# Patient Record
Sex: FEMALE | Race: BLACK OR AFRICAN AMERICAN | Employment: FULL TIME | URBAN - METROPOLITAN AREA
[De-identification: names, ages, dates, MRNs, and addresses within clinical notes are randomized per-mention and may not be internally consistent; named-entity substitution may affect disease eponyms.]

---

## 2019-04-25 ENCOUNTER — OFFICE VISIT (OUTPATIENT)
Dept: ONCOLOGY | Age: 44
End: 2019-04-25

## 2019-04-25 ENCOUNTER — HOSPITAL ENCOUNTER (OUTPATIENT)
Dept: INFUSION THERAPY | Age: 44
Discharge: HOME | End: 2019-04-25
Payer: MEDICAID

## 2019-04-25 VITALS
SYSTOLIC BLOOD PRESSURE: 132 MMHG | DIASTOLIC BLOOD PRESSURE: 79 MMHG | HEART RATE: 65 BPM | TEMPERATURE: 98 F | OXYGEN SATURATION: 100 %

## 2019-04-25 VITALS
RESPIRATION RATE: 18 BRPM | HEART RATE: 65 BPM | DIASTOLIC BLOOD PRESSURE: 65 MMHG | SYSTOLIC BLOOD PRESSURE: 109 MMHG | WEIGHT: 259 LBS | OXYGEN SATURATION: 100 % | TEMPERATURE: 98 F

## 2019-04-25 DIAGNOSIS — D64.9 ANEMIA, UNSPECIFIED TYPE: Primary | ICD-10-CM

## 2019-04-25 DIAGNOSIS — D64.9 ANEMIA, UNSPECIFIED TYPE: ICD-10-CM

## 2019-04-25 LAB
ALBUMIN SERPL-MCNC: 3.1 G/DL (ref 3.4–5)
ALBUMIN/GLOB SERPL: 0.6 {RATIO} (ref 0.8–1.7)
ALP SERPL-CCNC: 70 U/L (ref 45–117)
ALT SERPL-CCNC: 19 U/L (ref 13–56)
ANION GAP SERPL CALC-SCNC: 6 MMOL/L (ref 3–18)
AST SERPL-CCNC: 14 U/L (ref 15–37)
BASOPHILS # BLD: 0 K/UL (ref 0–0.1)
BASOPHILS NFR BLD: 1 % (ref 0–2)
BILIRUB SERPL-MCNC: 0.3 MG/DL (ref 0.2–1)
BUN SERPL-MCNC: 17 MG/DL (ref 7–18)
BUN/CREAT SERPL: 21 (ref 12–20)
CALCIUM SERPL-MCNC: 8.6 MG/DL (ref 8.5–10.1)
CHLORIDE SERPL-SCNC: 108 MMOL/L (ref 100–108)
CO2 SERPL-SCNC: 25 MMOL/L (ref 21–32)
CREAT SERPL-MCNC: 0.81 MG/DL (ref 0.6–1.3)
DIFFERENTIAL METHOD BLD: ABNORMAL
EOSINOPHIL # BLD: 0.2 K/UL (ref 0–0.4)
EOSINOPHIL NFR BLD: 3 % (ref 0–5)
ERYTHROCYTE [DISTWIDTH] IN BLOOD BY AUTOMATED COUNT: 18.1 % (ref 11.6–14.5)
FERRITIN SERPL-MCNC: 1 NG/ML (ref 8–388)
FOLATE SERPL-MCNC: 15 NG/ML (ref 3.1–17.5)
GLOBULIN SER CALC-MCNC: 5.6 G/DL (ref 2–4)
GLUCOSE SERPL-MCNC: 83 MG/DL (ref 74–99)
HCT VFR BLD AUTO: 23.7 % (ref 35–45)
HGB BLD-MCNC: 6.2 G/DL (ref 12–16)
IRON SATN MFR SERPL: 3 %
IRON SERPL-MCNC: 15 UG/DL (ref 50–175)
LDH SERPL L TO P-CCNC: 223 U/L (ref 81–234)
LYMPHOCYTES # BLD: 2.1 K/UL (ref 0.9–3.6)
LYMPHOCYTES NFR BLD: 35 % (ref 21–52)
MCH RBC QN AUTO: 16.8 PG (ref 24–34)
MCHC RBC AUTO-ENTMCNC: 26.2 G/DL (ref 31–37)
MCV RBC AUTO: 64.1 FL (ref 74–97)
MONOCYTES # BLD: 0.4 K/UL (ref 0.05–1.2)
MONOCYTES NFR BLD: 7 % (ref 3–10)
NEUTS SEG # BLD: 3.4 K/UL (ref 1.8–8)
NEUTS SEG NFR BLD: 54 % (ref 40–73)
PLATELET # BLD AUTO: 374 K/UL (ref 135–420)
PMV BLD AUTO: 9.7 FL (ref 9.2–11.8)
POTASSIUM SERPL-SCNC: 3.6 MMOL/L (ref 3.5–5.5)
PROT SERPL-MCNC: 8.7 G/DL (ref 6.4–8.2)
RBC # BLD AUTO: 3.7 M/UL (ref 4.2–5.3)
RETICS/RBC NFR AUTO: 1.1 % (ref 0.5–2.3)
SODIUM SERPL-SCNC: 139 MMOL/L (ref 136–145)
TIBC SERPL-MCNC: 461 UG/DL (ref 250–450)
TSH SERPL DL<=0.05 MIU/L-ACNC: 1.1 UIU/ML (ref 0.36–3.74)
VIT B12 SERPL-MCNC: 306 PG/ML (ref 211–911)
WBC # BLD AUTO: 6.1 K/UL (ref 4.6–13.2)

## 2019-04-25 PROCEDURE — 83615 LACTATE (LD) (LDH) ENZYME: CPT

## 2019-04-25 PROCEDURE — 82607 VITAMIN B-12: CPT

## 2019-04-25 PROCEDURE — 36415 COLL VENOUS BLD VENIPUNCTURE: CPT

## 2019-04-25 PROCEDURE — 85025 COMPLETE CBC W/AUTO DIFF WBC: CPT

## 2019-04-25 PROCEDURE — 83010 ASSAY OF HAPTOGLOBIN QUANT: CPT

## 2019-04-25 PROCEDURE — 82728 ASSAY OF FERRITIN: CPT

## 2019-04-25 PROCEDURE — 83021 HEMOGLOBIN CHROMOTOGRAPHY: CPT

## 2019-04-25 PROCEDURE — 83540 ASSAY OF IRON: CPT

## 2019-04-25 PROCEDURE — 84443 ASSAY THYROID STIM HORMONE: CPT

## 2019-04-25 PROCEDURE — 86923 COMPATIBILITY TEST ELECTRIC: CPT

## 2019-04-25 PROCEDURE — 86900 BLOOD TYPING SEROLOGIC ABO: CPT

## 2019-04-25 PROCEDURE — 80053 COMPREHEN METABOLIC PANEL: CPT

## 2019-04-25 PROCEDURE — 85045 AUTOMATED RETICULOCYTE COUNT: CPT

## 2019-04-25 RX ORDER — LISINOPRIL AND HYDROCHLOROTHIAZIDE 10; 12.5 MG/1; MG/1
TABLET ORAL
Refills: 4 | COMMUNITY
Start: 2019-03-28

## 2019-04-25 NOTE — PROGRESS NOTES
Álvaro Sawant is a 37 y.o. female presenting today for a new patient appointment. Patient is ambulatory with no assistive devices and reports fatigue. Chief Complaint Patient presents with  Anemia Visit Vitals /65 (BP 1 Location: Right arm, BP Patient Position: Sitting) Pulse 65 Temp 98 °F (36.7 °C) (Oral) Resp 18 Wt 259 lb (117.5 kg) LMP 04/10/2019 (Exact Date) SpO2 100% Current Outpatient Medications Medication Sig  
 lisinopril-hydroCHLOROthiazide (PRINZIDE, ZESTORETIC) 10-12.5 mg per tablet No current facility-administered medications for this visit. Medications no longer taking/discontinued: none No flowsheet data found. 3 most recent PHQ Screens 4/25/2019 Little interest or pleasure in doing things Not at all Feeling down, depressed, irritable, or hopeless Not at all Total Score PHQ 2 0 No flowsheet data found. Learning Assessment 4/25/2019 PRIMARY LEARNER Patient HIGHEST LEVEL OF EDUCATION - PRIMARY LEARNER  GRADUATED HIGH SCHOOL OR GED PRIMARY LANGUAGE ENGLISH  
LEARNER PREFERENCE PRIMARY LISTENING  
  READING  
ANSWERED BY patient RELATIONSHIP SELF

## 2019-04-25 NOTE — PROGRESS NOTES
[facility] Oncology [address] [facility], [address] [facility] Office Phone: [phone] Fax: [phone] NEW HEME/ONC CONSULT Reason for visit:  Anemia HPI:   Roni Morfin is a 37 y.o.  female history of uterine fibroids status post myomectomy in ,  x2, HTN, who I was asked to see in consultation at the request of Dr. Katherine Healy for evaluation for anemia. Xavier Gonsalez HEMATOLOGY HISTORY: Get records Past medical history:Menses with blood clots and heavy first 2 days but regular with cramping Past surgical history:C-sectionx2, myomectomy Social history:No tobacco, alcohol or IVDU Family history: not relevant. No FMH of blleding disorder. Current Outpatient Medications Medication Sig Dispense Refill  lisinopril-hydroCHLOROthiazide (PRINZIDE, ZESTORETIC) 10-12.5 mg per tablet   4 Not on File Review of Systems All 12 point review of system negative except patient complaining of feeling spacey with difficulty concentrating as well as feeling fatigued. She has pagophagia:chews ice a lot. Objective: 
Physical Exam: 
Visit Vitals /65 (BP 1 Location: Right arm, BP Patient Position: Sitting) Pulse 65 Temp 98 °F (36.7 °C) (Oral) Resp 18 Wt 117.5 kg (259 lb) LMP 04/10/2019 (Exact Date) SpO2 100% General:  Alert, cooperative, no distress, appears stated age. Head:  Normocephalic, without obvious abnormality, atraumatic. Eyes:  Conjunctivae/corneas clear. PERRL, EOMs intact. Throat: Lips, mucosa, and tongue normal.   
Neck: Supple, symmetrical, trachea midline, no adenopathy, thyroid: no enlargement/tenderness/nodules Back:   Symmetric, no curvature. ROM normal. No CVA tenderness. Lungs:   Clear to auscultation bilaterally. Chest wall:  No tenderness or deformity. Heart:  Regular rate and rhythm, S1, S2 normal, no murmur, click, rub or gallop. Abdomen:   Soft, non-tender.  Bowel sounds normal. No masses,  No organomegaly. Extremities: Extremities normal, atraumatic, no cyanosis or edema. Skin: Skin color, texture, turgor normal. No rashes or lesions. Lymph nodes: Cervical, supraclavicular, and axillary nodes normal.  
Neurologic: CNII-XII intact. Diagnostic Imaging No results found for this or any previous visit. Lab Results No results found for: WBC, HGB, HGBP, HCT, PHCT, RBCH, PLT, MCV, HGBEXT, HCTEXT, PLTEXT No results found for: NA, K, CL, CO2, AGAP, GLU, BUN, CREA, BUCR, GFRAA, GFRNA, CA, TBIL, GPT, SGOT, AP, TP, ALB, GLOB, AGRAT, ALT Assessment/Plan: 
37 y.o. female 1. Anemia, unspecified type Likely has ARMIDA based on history and symptoms of pagopahgia and fatigue. Was told by PCP also that based on her labs, IV iron would be better for her. No BRBPR or melena. On 3/28/19, WBC=7.3, H/h=6.5/24. 3. Platelets=398, LNN=86 Check CBC with differential, CMP, iron profile, ferritin, B12 and folate, ldh and haptoglobin. Give  1 unit of pRBCs if Hb=7.0 Refer to GI Give IV iron if needed. Yesterday came back showed WBC 6.1, H&H 6.3/25, MCV 66, platelet 760. Patient to get 2 units of packed red blood cell tomorrow. Ferritin= 1, TSAT=3% Give IV iron Injectafer x2 after blood transfusion. Return in 2 weeks

## 2019-04-25 NOTE — PROGRESS NOTES
[facility] Progress Note Date: 2019 Name: Lidia Hayden MRN: [id number] : 1975 Peripheral Lab Draw Ms. Hopper to [facility], ambulatory [id number] accompanied by self. Pt was assessed and education was provided. Ms. Alyssa Aguilar vitals were reviewed and patient was observed for 5 minutes prior to treatment. Visit Vitals /79 (BP 1 Location: Right arm, BP Patient Position: Sitting) Pulse 65 Temp 98 °F (36.7 °C) SpO2 100% Recent Results (from the past 12 hour(s)) RETICULOCYTE COUNT Collection Time: 19 10:05 AM  
Result Value Ref Range Reticulocyte count 1.1 0.5 - 2.3 % CBC WITH AUTOMATED DIFF Collection Time: 19 10:05 AM  
Result Value Ref Range WBC 6.1 4.6 - 13.2 K/uL  
 RBC 3.70 (L) 4.20 - 5.30 M/uL HGB 6.2 (L) 12.0 - 16.0 g/dL HCT 23.7 (L) 35.0 - 45.0 % MCV 64.1 (L) 74.0 - 97.0 FL  
 MCH 16.8 (L) 24.0 - 34.0 PG  
 MCHC 26.2 (L) 31.0 - 37.0 g/dL  
 RDW 18.1 (H) 11.6 - 14.5 % PLATELET 418 062 - 745 K/uL MPV 9.7 9.2 - 11.8 FL  
 NEUTROPHILS 54 40 - 73 % LYMPHOCYTES 35 21 - 52 % MONOCYTES 7 3 - 10 % EOSINOPHILS 3 0 - 5 % BASOPHILS 1 0 - 2 %  
 ABS. NEUTROPHILS 3.4 1.8 - 8.0 K/UL  
 ABS. LYMPHOCYTES 2.1 0.9 - 3.6 K/UL  
 ABS. MONOCYTES 0.4 0.05 - 1.2 K/UL  
 ABS. EOSINOPHILS 0.2 0.0 - 0.4 K/UL  
 ABS. BASOPHILS 0.0 0.0 - 0.1 K/UL  
 DF AUTOMATED Blood obtained peripherally from left arm x 1 attempt with butterfly needle and sent to lab for cbc w/ diff, hemoglobin fractionation, haptoglobin, LD,reticulocyte count, TSH 3rd generation,Vitamin B12&folate, CMP,iron profile,ferritin,and type + crossmatch per written orders. No bleeding or hematoma noted at site. Guaze and coban applied. Ms. Avtar Johnson tolerated the phlebotomy, and had no complaints. Patient armband removed and shredded. Ms. Avtar Johnson was discharged from [place] [address] in stable condition at 1015. She is to return to physician for follow up. Nia Leon, Phlebotomist  
April 25, 2019 
11:03 AM

## 2019-04-26 ENCOUNTER — HOSPITAL ENCOUNTER (OUTPATIENT)
Dept: INFUSION THERAPY | Age: 44
Discharge: HOME | End: 2019-04-26
Payer: MEDICAID

## 2019-04-26 VITALS
OXYGEN SATURATION: 100 % | TEMPERATURE: 96.6 F | HEART RATE: 67 BPM | RESPIRATION RATE: 15 BRPM | SYSTOLIC BLOOD PRESSURE: 126 MMHG | DIASTOLIC BLOOD PRESSURE: 82 MMHG

## 2019-04-26 LAB — HAPTOGLOB SERPL-MCNC: 222 MG/DL (ref 34–200)

## 2019-04-26 PROCEDURE — 77030013169 SET IV BLD ICUM -A

## 2019-04-26 PROCEDURE — 74011250637 HC RX REV CODE- 250/637: Performed by: INTERNAL MEDICINE

## 2019-04-26 PROCEDURE — P9016 RBC LEUKOCYTES REDUCED: HCPCS

## 2019-04-26 PROCEDURE — 36430 TRANSFUSION BLD/BLD COMPNT: CPT

## 2019-04-26 RX ORDER — SODIUM CHLORIDE 9 MG/ML
250 INJECTION, SOLUTION INTRAVENOUS AS NEEDED
Status: DISCONTINUED | OUTPATIENT
Start: 2019-04-26 | End: 2019-04-29 | Stop reason: HOSPADM

## 2019-04-26 RX ORDER — ACETAMINOPHEN 325 MG/1
650 TABLET ORAL ONCE
Status: COMPLETED | OUTPATIENT
Start: 2019-04-26 | End: 2019-04-26

## 2019-04-26 RX ORDER — DIPHENHYDRAMINE HCL 25 MG
25 CAPSULE ORAL ONCE
Status: COMPLETED | OUTPATIENT
Start: 2019-04-26 | End: 2019-04-26

## 2019-04-26 RX ADMIN — ACETAMINOPHEN 650 MG: 325 TABLET ORAL at 08:43

## 2019-04-26 RX ADMIN — DIPHENHYDRAMINE HYDROCHLORIDE 25 MG: 25 CAPSULE ORAL at 08:43

## 2019-04-26 NOTE — PROGRESS NOTES
[facility] Progress Note Date: 2019 Name: Catherine Ramos MRN: [id number] : 1975 Blood Transfusion: 2 Units PRBC Ms. Dueñas arrived to [facility] accompanied by her mother. Ms. Robb Conti was assessed and education was provided. Discussed risks and benefits of blood transfusion with patient, including risk of transfusion reaction and disease transmission. Patient verbalized understanding and signed consent placed on chart. Ms. Hammad Thomas vitals were reviewed. Visit Vitals /83 (BP 1 Location: Left arm, BP Patient Position: Sitting) Pulse 68 Temp 97.3 °F (36.3 °C) Resp 17 SpO2 100% Breastfeeding? No  
 
 
22g IV inserted in patient's Right antecubital, condition patent and no redness x2 attempts by Maddy Lenz RN. Positive for blood return and flushes without difficulty. Normal saline initiated at [facility]. Pre-medications (Tylenol 650 mg and Benadryl 25 mg) were administered as ordered. Patient Vitals for the past 24 hrs: 
 Temp Pulse Resp BP SpO2  
19 1429 96.6 °F (35.9 °C) 67 15 126/82 100 % 19 1400 97 °F (36.1 °C) 72 15 122/81 100 % 19 1300 97.7 °F (36.5 °C) 65 16 120/77 100 % 19 1230 97.9 °F (36.6 °C) 69 15 131/82 100 % 19 1215 98 °F (36.7 °C) 63 16 127/80 100 % 19 1120 97.2 °F (36.2 °C) 61 15 129/86 100 % 19 1020 98.4 °F (36.9 °C) 66 16 114/74 100 % 19 0950 97 °F (36.1 °C) 66 15 112/73 100 % 19 0935 97.5 °F (36.4 °C) 69 16 116/82 100 % 19 0914 98 °F (36.7 °C) 72 16 115/81 100 % 19 0815 97.3 °F (36.3 °C) 68 17 120/83 100 % First unit of two ordered units of PRBCs initiated @ 75 ml/hr at 0920. Fifteen minutes into infusion, VS stable and pt denied c/o SOB, itching/hives, lip/tongue/facial swelling, CP or other complaints. Infusion rate increased to 100 ml/hr.  Fifteen minutes later, VS stable and pt denied complaints; infusion rate increased to 150 ml/hr for the remainder of the transfusion. Unit finished @ 1140. VS stable and no transfusion reaction suspected. Second unit of two ordered units of PRBCs initiated @ 75 ml/hr at 1200. Fifteen minutes into infusion, VS stable and pt denied c/o SOB, itching/hives, lip/tongue/facial swelling, CP or other complaints. Infusion rate increased to 100 ml/hr. Fifteen minutes later, VS stable and pt denied complaints; infusion rate increased to 150 ml/hr for the remainder of the transfusion. Unit finished @ 1400. VS stable and no transfusion reaction suspected. Ms. Tejal Curran tolerated infusion without complaints. IV removed. No irritation, bleeding, or hematoma noted at site. Gauze and coban applied to site. Patient remained in [facility] for 40 minutes for observation. No signs of allergic reaction noted. Discharge instructions reviewed with pt. Pt instructed to report SOB, CP, elevated temp, back pain, or other symptoms of transfusion reaction to MD or ED. Pt verbalized understanding. Patient armband removed and shredded Ms. Dueñas was discharged from [place] [address] in stable condition at 1440. She is to follow up with Dr. Kings Antoine. Mahogany Arrieta, RN April 26, 2019  
1440

## 2019-04-27 LAB
ABO + RH BLD: NORMAL
BLD PROD TYP BPU: NORMAL
BLD PROD TYP BPU: NORMAL
BLOOD GROUP ANTIBODIES SERPL: NORMAL
BPU ID: NORMAL
BPU ID: NORMAL
CROSSMATCH RESULT,%XM: NORMAL
CROSSMATCH RESULT,%XM: NORMAL
SPECIMEN EXP DATE BLD: NORMAL
STATUS OF UNIT,%ST: NORMAL
STATUS OF UNIT,%ST: NORMAL
UNIT DIVISION, %UDIV: 0
UNIT DIVISION, %UDIV: 0

## 2019-04-29 LAB
DEPRECATED HGB OTHER BLD-IMP: 0 %
HGB A MFR BLD: 98.7 % (ref 96.4–98.8)
HGB A2 MFR BLD COLUMN CHROM: 1.3 % (ref 1.8–3.2)
HGB C MFR BLD: 0 %
HGB F MFR BLD: 0 % (ref 0–2)
HGB FRACT BLD-IMP: ABNORMAL
HGB S BLD QL SOLY: NEGATIVE
HGB S MFR BLD: 0 %

## 2019-05-09 ENCOUNTER — OFFICE VISIT (OUTPATIENT)
Dept: ONCOLOGY | Age: 44
End: 2019-05-09

## 2019-05-09 VITALS
RESPIRATION RATE: 17 BRPM | SYSTOLIC BLOOD PRESSURE: 114 MMHG | DIASTOLIC BLOOD PRESSURE: 78 MMHG | WEIGHT: 257 LBS | TEMPERATURE: 97.4 F | OXYGEN SATURATION: 100 % | HEART RATE: 63 BPM

## 2019-05-09 DIAGNOSIS — D50.9 IRON DEFICIENCY ANEMIA, UNSPECIFIED IRON DEFICIENCY ANEMIA TYPE: Primary | ICD-10-CM

## 2019-05-09 NOTE — PROGRESS NOTES
Daniele Agarwal is a 37 y.o. 1975 female and presents with uterine fibroids status post myomectomy in ,  x2, HTN, who I was asked to see in consultation at the request of Dr. Sacha Smith for evaluation for anemia.     DX: Anemia     HEMATOLOGY HISTORY: Get records        Past Medical History:   Diagnosis Date    Anemia     History of uterine fibroid     Hypertension      Past Surgical History:   Procedure Laterality Date    HX  SECTION      HX  SECTION      HX MYOMECTOMY      HX TUBAL LIGATION       Social History     Socioeconomic History    Marital status: UNKNOWN     Spouse name: Not on file    Number of children: Not on file    Years of education: Not on file    Highest education level: Not on file   Tobacco Use    Smoking status: Never Smoker    Smokeless tobacco: Never Used   Substance and Sexual Activity    Alcohol use: Not Currently    Drug use: Never    Sexual activity: Yes     Family History   Problem Relation Age of Onset    Heart Disease Mother     Hypertension Mother     Heart Disease Father     Hypertension Father        Current Outpatient Medications   Medication Sig Dispense Refill    lisinopril-hydroCHLOROthiazide (PRINZIDE, ZESTORETIC) 10-12.5 mg per tablet   4       No Known Allergies    Review of Systems    A comprehensive review of systems was negative except for: menses are getting better        Objective:  Visit Vitals  /78 (BP 1 Location: Right arm, BP Patient Position: Sitting)   Pulse 63   Temp 97.4 °F (36.3 °C) (Oral)   Resp 17   Wt 116.6 kg (257 lb)   LMP 2019   SpO2 100%         Physical Exam:   General appearance - alert, well appearing, and in no distress  Mental status - alert, oriented to person, place, and time  EYE-MIKKI, EOMI  ENT-ENT exam normal, no neck nodes or sinus tenderness  Mouth - mucous membranes moist, pharynx normal without lesions  Neck - supple, no significant adenopathy   Chest - clear to auscultation, no wheezes, rales or rhonchi, symmetric air entry   Heart - normal rate and regular rhythm   Abdomen - soft, nontender, nondistended, no masses or organomegaly  Lymph- no adenopathy palpable  Ext-no pedal edema noted  Skin-Warm and dry. Neuro -alert, oriented, normal speech, no focal findings or movement disorder noted      Diagnostic Imaging     No results found for this or any previous visit. No results found for this or any previous visit. No results found for this or any previous visit. Lab Results  Lab Results   Component Value Date/Time    WBC 6.1 04/25/2019 10:05 AM    HGB 6.2 (L) 04/25/2019 10:05 AM    HCT 23.7 (L) 04/25/2019 10:05 AM    PLATELET 010 72/30/2653 10:05 AM    MCV 64.1 (L) 04/25/2019 10:05 AM       Lab Results   Component Value Date/Time    Sodium 139 04/25/2019 10:05 AM    Potassium 3.6 04/25/2019 10:05 AM    Chloride 108 04/25/2019 10:05 AM    CO2 25 04/25/2019 10:05 AM    Anion gap 6 04/25/2019 10:05 AM    Glucose 83 04/25/2019 10:05 AM    BUN 17 04/25/2019 10:05 AM    Creatinine 0.81 04/25/2019 10:05 AM    BUN/Creatinine ratio 21 (H) 04/25/2019 10:05 AM    GFR est AA >60 04/25/2019 10:05 AM    GFR est non-AA >60 04/25/2019 10:05 AM    Calcium 8.6 04/25/2019 10:05 AM    AST (SGOT) 14 (L) 04/25/2019 10:05 AM    Alk. phosphatase 70 04/25/2019 10:05 AM    Protein, total 8.7 (H) 04/25/2019 10:05 AM    Albumin 3.1 (L) 04/25/2019 10:05 AM    Globulin 5.6 (H) 04/25/2019 10:05 AM    A-G Ratio 0.6 (L) 04/25/2019 10:05 AM    ALT (SGPT) 19 04/25/2019 10:05 AM     Assessment/Plan:  1. Anemia, unspecified type  Likely has ARMIDA based on history and symptoms of pagopahgia and fatigue. Was told by PCP also that based on her labs, IV iron would be better for her. No BRBPR or melena. On 3/28/19, WBC=7.3, H/h=6.5/24. 3.  Platelets=398, SUX=48  Check CBC with differential, CMP, TSAT=3%, ferritin=1, B12 and folate wnl, ldh and haptoglobin wnl on 4/25/19  Got 1 unit of pRBCs if Hb=7.0  Refer to GI  Give IV iron if needed. Rr     Yesterday came back showed WBC 6.1, H&H 6.3/25, MCV 66, platelet 895. Patient to get 2 units of packed red blood cell tomorrow. Ferritin= 1, TSAT=3%  Give IV iron Injectafer x2 after blood transfusion.   Refer to OBGYN        Return in  8 weeks          Mikayla Scott MD

## 2019-05-09 NOTE — PROGRESS NOTES
Marcie Mendez is a 37 y.o. female presenting today for a follow-up r/t anemia. Patient is ambulatory with no assistive devices and denies any complaints. Chief Complaint   Patient presents with    Anemia     follow up       Visit Vitals  /78 (BP 1 Location: Right arm, BP Patient Position: Sitting)   Pulse 63   Temp 97.4 °F (36.3 °C) (Oral)   Resp 17   Wt 116.6 kg (257 lb)   LMP 05/07/2019   SpO2 100%       Current Outpatient Medications   Medication Sig    lisinopril-hydroCHLOROthiazide (PRINZIDE, ZESTORETIC) 10-12.5 mg per tablet      No current facility-administered medications for this visit. Medications no longer taking/discontinued: none        3 most recent PHQ Screens 4/25/2019   Little interest or pleasure in doing things Not at all   Feeling down, depressed, irritable, or hopeless Not at all   Total Score PHQ 2 0         1. Have you been to the ER, urgent care clinic since your last visit? Hospitalized since your last visit?no    2. Have you seen or consulted any other health care providers outside of the [address] [place] [address] since your last visit? Include any pap smears or colon screening.  no

## 2019-05-23 ENCOUNTER — HOSPITAL ENCOUNTER (OUTPATIENT)
Dept: INFUSION THERAPY | Age: 44
End: 2019-05-23

## 2019-05-30 ENCOUNTER — APPOINTMENT (OUTPATIENT)
Dept: INFUSION THERAPY | Age: 44
End: 2019-05-30

## 2019-07-09 DIAGNOSIS — D50.9 IRON DEFICIENCY ANEMIA, UNSPECIFIED IRON DEFICIENCY ANEMIA TYPE: ICD-10-CM

## 2019-09-19 ENCOUNTER — HOSPITAL ENCOUNTER (OUTPATIENT)
Dept: LAB | Age: 44
Discharge: HOME | End: 2019-09-19

## 2019-09-19 ENCOUNTER — OFFICE VISIT (OUTPATIENT)
Dept: ONCOLOGY | Age: 44
End: 2019-09-19

## 2019-09-19 VITALS
RESPIRATION RATE: 18 BRPM | TEMPERATURE: 97.9 F | HEART RATE: 72 BPM | SYSTOLIC BLOOD PRESSURE: 120 MMHG | OXYGEN SATURATION: 98 % | WEIGHT: 262.4 LBS | DIASTOLIC BLOOD PRESSURE: 75 MMHG

## 2019-09-19 DIAGNOSIS — N92.4 EXCESSIVE BLEEDING IN PREMENOPAUSAL PERIOD: ICD-10-CM

## 2019-09-19 DIAGNOSIS — D50.9 IRON DEFICIENCY ANEMIA, UNSPECIFIED IRON DEFICIENCY ANEMIA TYPE: Primary | ICD-10-CM

## 2019-09-19 LAB — SENTARA SPECIMEN COL,SENBCF: NORMAL

## 2019-09-19 PROCEDURE — 99001 SPECIMEN HANDLING PT-LAB: CPT

## 2019-09-19 NOTE — PROGRESS NOTES
Cordell Mitchell is a 37 y.o. 1975 female and presents with Anemia (Iron deficiency anemia)  Patient with uterine fibroids status post myomectomy in ,  x2, HTN, who I was asked to see in consultation at the request of Dr. Hart for evaluation for anemia. I found that she had iron deficiency anemia and she status post 2 units of packed red blood cells due to hemoglobin of 6.3, was scheduled to see OB/GYN due to menometrorrhagia as well as to receive IV iron infusion. However patient did not have IV iron and did not also see OB/GYN due to insurance issues she said. She now has insurance again she stated. On review of systems today she reports fatigue. Denies any fevers, chills, shortness of breath, nausea vomiting abdominal pain. Has heavy menses but much better. No focal neurologic deficit. Mood is stable. ECOG performance status 0. Independent with ADLs and IADLs. LMP: 19     DX: Anemia    Past Medical History:   Diagnosis Date    Anemia     History of uterine fibroid     Hypertension      Past Surgical History:   Procedure Laterality Date    HX  SECTION      HX  SECTION      HX MYOMECTOMY      HX TUBAL LIGATION       Social History     Socioeconomic History    Marital status: UNKNOWN     Spouse name: Not on file    Number of children: Not on file    Years of education: Not on file    Highest education level: Not on file   Tobacco Use    Smoking status: Never Smoker    Smokeless tobacco: Never Used   Substance and Sexual Activity    Alcohol use: Not Currently    Drug use: Never    Sexual activity: Yes     Family History   Problem Relation Age of Onset    Heart Disease Mother     Hypertension Mother     Heart Disease Father     Hypertension Father        Current Outpatient Medications   Medication Sig Dispense Refill    lisinopril-hydroCHLOROthiazide (PRINZIDE, ZESTORETIC) 10-12.5 mg per tablet   4       No Known Allergies    Review of Systems    Denies any fevers, chills, shortness of breath, nausea vomiting abdominal pain. Has heavy menses but much better. No focal neurologic deficit. Mood is stable. ECOG performance status 0. Independent with ADLs and IADLs. Objective:  Visit Vitals  /75   Pulse 72   Temp 97.9 °F (36.6 °C) (Oral)   Resp 18   Wt 119 kg (262 lb 6.4 oz)   SpO2 98%         Physical Exam:   General appearance - alert, well appearing, and in no distress  Mental status - alert, oriented to person, place, and time  EYE-MIKKI, EOMI  ENT-ENT exam normal, no neck nodes or sinus tenderness  Mouth - mucous membranes moist, pharynx normal without lesions  Neck - supple, no significant adenopathy   Chest - clear to auscultation, no wheezes, rales or rhonchi, symmetric air entry   Heart - normal rate and regular rhythm   Abdomen - soft, nontender, nondistended, no masses or organomegaly  Lymph- no adenopathy palpable  Ext-no pedal edema noted  Skin-Warm and dry. Neuro -alert, oriented, normal speech, no focal findings or movement disorder noted      Diagnostic Imaging     No results found for this or any previous visit. No results found for this or any previous visit. No results found for this or any previous visit.     Lab Results  Lab Results   Component Value Date/Time    WBC 6.1 04/25/2019 10:05 AM    HGB 6.2 (L) 04/25/2019 10:05 AM    HCT 23.7 (L) 04/25/2019 10:05 AM    PLATELET 776 79/50/3785 10:05 AM    MCV 64.1 (L) 04/25/2019 10:05 AM       Lab Results   Component Value Date/Time    Sodium 139 04/25/2019 10:05 AM    Potassium 3.6 04/25/2019 10:05 AM    Chloride 108 04/25/2019 10:05 AM    CO2 25 04/25/2019 10:05 AM    Anion gap 6 04/25/2019 10:05 AM    Glucose 83 04/25/2019 10:05 AM    BUN 17 04/25/2019 10:05 AM    Creatinine 0.81 04/25/2019 10:05 AM    BUN/Creatinine ratio 21 (H) 04/25/2019 10:05 AM    GFR est AA >60 04/25/2019 10:05 AM    GFR est non-AA >60 04/25/2019 10:05 AM    Calcium 8.6 04/25/2019 10:05 AM AST (SGOT) 14 (L) 04/25/2019 10:05 AM    Alk. phosphatase 70 04/25/2019 10:05 AM    Protein, total 8.7 (H) 04/25/2019 10:05 AM    Albumin 3.1 (L) 04/25/2019 10:05 AM    Globulin 5.6 (H) 04/25/2019 10:05 AM    A-G Ratio 0.6 (L) 04/25/2019 10:05 AM    ALT (SGPT) 19 04/25/2019 10:05 AM       Assessment/Plan:  1. Anemia, unspecified type  *Recheck labs as ordered  *If still iron deficient then will give her IV iron. 2.  Heavy menstrual bleeding:  Follow-up with OB/GYN.        Return in  3 months with repeat CBC, CMP, iron profile and ferritin.         Riki Harrison MD

## 2021-02-08 DIAGNOSIS — D50.9 IRON DEFICIENCY ANEMIA, UNSPECIFIED IRON DEFICIENCY ANEMIA TYPE: Primary | ICD-10-CM

## 2021-02-09 ENCOUNTER — HOSPITAL ENCOUNTER (OUTPATIENT)
Dept: INFUSION THERAPY | Age: 46
Discharge: HOME | End: 2021-02-09
Payer: MEDICAID

## 2021-02-09 DIAGNOSIS — D50.9 IRON DEFICIENCY ANEMIA, UNSPECIFIED IRON DEFICIENCY ANEMIA TYPE: ICD-10-CM

## 2021-02-09 LAB
ALBUMIN SERPL-MCNC: 3.3 G/DL (ref 3.4–5)
ALBUMIN/GLOB SERPL: 0.6 {RATIO} (ref 0.8–1.7)
ALP SERPL-CCNC: 75 U/L (ref 45–117)
ALT SERPL-CCNC: 17 U/L (ref 13–56)
ANION GAP SERPL CALC-SCNC: 6 MMOL/L (ref 3–18)
AST SERPL-CCNC: 7 U/L (ref 10–38)
BASO+EOS+MONOS # BLD AUTO: 0.7 K/UL (ref 0–2.3)
BASO+EOS+MONOS NFR BLD AUTO: 8 % (ref 0.1–17)
BILIRUB SERPL-MCNC: 0.3 MG/DL (ref 0.2–1)
BUN SERPL-MCNC: 15 MG/DL (ref 7–18)
BUN/CREAT SERPL: 19 (ref 12–20)
CALCIUM SERPL-MCNC: 8.8 MG/DL (ref 8.5–10.1)
CHLORIDE SERPL-SCNC: 106 MMOL/L (ref 100–111)
CO2 SERPL-SCNC: 26 MMOL/L (ref 21–32)
CREAT SERPL-MCNC: 0.8 MG/DL (ref 0.6–1.3)
DIFFERENTIAL METHOD BLD: ABNORMAL
ERYTHROCYTE [DISTWIDTH] IN BLOOD BY AUTOMATED COUNT: 17.3 % (ref 11.5–14.5)
FERRITIN SERPL-MCNC: 3 NG/ML (ref 8–388)
FOLATE SERPL-MCNC: 8.6 NG/ML (ref 3.1–17.5)
GLOBULIN SER CALC-MCNC: 5.8 G/DL (ref 2–4)
GLUCOSE SERPL-MCNC: 90 MG/DL (ref 74–99)
HCT VFR BLD AUTO: 26.8 % (ref 36–48)
HGB BLD-MCNC: 7.1 G/DL (ref 12–16)
IRON SATN MFR SERPL: 3 % (ref 20–50)
IRON SERPL-MCNC: 12 UG/DL (ref 50–175)
LYMPHOCYTES # BLD: 2.6 K/UL (ref 1.1–5.9)
LYMPHOCYTES NFR BLD: 26 % (ref 14–44)
MCH RBC QN AUTO: 17.4 PG (ref 25–35)
MCHC RBC AUTO-ENTMCNC: 26.5 G/DL (ref 31–37)
MCV RBC AUTO: 65.8 FL (ref 78–102)
NEUTS SEG # BLD: 6.5 K/UL (ref 1.8–9.5)
NEUTS SEG NFR BLD: 66 % (ref 40–70)
PLATELET # BLD AUTO: 490 K/UL (ref 140–440)
POTASSIUM SERPL-SCNC: 3.1 MMOL/L (ref 3.5–5.5)
PROT SERPL-MCNC: 9.1 G/DL (ref 6.4–8.2)
RBC # BLD AUTO: 4.07 M/UL (ref 4.1–5.1)
SODIUM SERPL-SCNC: 138 MMOL/L (ref 136–145)
TIBC SERPL-MCNC: 444 UG/DL (ref 250–450)
VIT B12 SERPL-MCNC: 270 PG/ML (ref 211–911)
WBC # BLD AUTO: 9.8 K/UL (ref 4.5–13)

## 2021-02-09 PROCEDURE — 36415 COLL VENOUS BLD VENIPUNCTURE: CPT

## 2021-02-09 PROCEDURE — 80053 COMPREHEN METABOLIC PANEL: CPT

## 2021-02-09 PROCEDURE — 82728 ASSAY OF FERRITIN: CPT

## 2021-02-09 PROCEDURE — 82607 VITAMIN B-12: CPT

## 2021-02-09 PROCEDURE — 85025 COMPLETE CBC W/AUTO DIFF WBC: CPT

## 2021-02-09 PROCEDURE — 83540 ASSAY OF IRON: CPT

## 2021-02-10 DIAGNOSIS — D50.9 IRON DEFICIENCY ANEMIA, UNSPECIFIED IRON DEFICIENCY ANEMIA TYPE: ICD-10-CM

## 2021-02-10 DIAGNOSIS — D50.9 IRON DEFICIENCY ANEMIA, UNSPECIFIED IRON DEFICIENCY ANEMIA TYPE: Primary | ICD-10-CM

## 2021-02-12 RX ORDER — HEPARIN 100 UNIT/ML
300-500 SYRINGE INTRAVENOUS AS NEEDED
Status: CANCELLED
Start: 2021-02-19

## 2021-02-12 RX ORDER — ALBUTEROL SULFATE 0.83 MG/ML
2.5 SOLUTION RESPIRATORY (INHALATION) AS NEEDED
Status: CANCELLED
Start: 2021-02-19

## 2021-02-12 RX ORDER — ONDANSETRON 2 MG/ML
8 INJECTION INTRAMUSCULAR; INTRAVENOUS AS NEEDED
Status: CANCELLED | OUTPATIENT
Start: 2021-02-19

## 2021-02-12 RX ORDER — EPINEPHRINE 1 MG/ML
0.3 INJECTION, SOLUTION, CONCENTRATE INTRAVENOUS AS NEEDED
Status: CANCELLED | OUTPATIENT
Start: 2021-02-19

## 2021-02-12 RX ORDER — DIPHENHYDRAMINE HYDROCHLORIDE 50 MG/ML
50 INJECTION, SOLUTION INTRAMUSCULAR; INTRAVENOUS AS NEEDED
Status: CANCELLED
Start: 2021-02-19

## 2021-02-12 RX ORDER — SODIUM CHLORIDE 9 MG/ML
10 INJECTION INTRAMUSCULAR; INTRAVENOUS; SUBCUTANEOUS AS NEEDED
Status: CANCELLED | OUTPATIENT
Start: 2021-02-19

## 2021-02-12 RX ORDER — HYDROCORTISONE SODIUM SUCCINATE 100 MG/2ML
100 INJECTION, POWDER, FOR SOLUTION INTRAMUSCULAR; INTRAVENOUS AS NEEDED
Status: CANCELLED | OUTPATIENT
Start: 2021-02-19

## 2021-02-12 RX ORDER — ACETAMINOPHEN 325 MG/1
650 TABLET ORAL AS NEEDED
Status: CANCELLED
Start: 2021-02-19

## 2021-02-12 RX ORDER — DIPHENHYDRAMINE HYDROCHLORIDE 50 MG/ML
25 INJECTION, SOLUTION INTRAMUSCULAR; INTRAVENOUS AS NEEDED
Status: CANCELLED
Start: 2021-02-19

## 2021-02-19 ENCOUNTER — HOSPITAL ENCOUNTER (OUTPATIENT)
Dept: INFUSION THERAPY | Age: 46
Discharge: HOME | End: 2021-02-19
Payer: MEDICAID

## 2021-02-19 VITALS
DIASTOLIC BLOOD PRESSURE: 73 MMHG | TEMPERATURE: 96.8 F | SYSTOLIC BLOOD PRESSURE: 125 MMHG | RESPIRATION RATE: 16 BRPM | OXYGEN SATURATION: 100 % | HEART RATE: 65 BPM

## 2021-02-19 DIAGNOSIS — D50.9 IRON DEFICIENCY ANEMIA, UNSPECIFIED IRON DEFICIENCY ANEMIA TYPE: Primary | ICD-10-CM

## 2021-02-19 DIAGNOSIS — D50.9 IRON DEFICIENCY ANEMIA, UNSPECIFIED IRON DEFICIENCY ANEMIA TYPE: ICD-10-CM

## 2021-02-19 PROCEDURE — 96365 THER/PROPH/DIAG IV INF INIT: CPT

## 2021-02-19 PROCEDURE — 36415 COLL VENOUS BLD VENIPUNCTURE: CPT

## 2021-02-19 PROCEDURE — 74011250636 HC RX REV CODE- 250/636: Performed by: INTERNAL MEDICINE

## 2021-02-19 PROCEDURE — 83516 IMMUNOASSAY NONANTIBODY: CPT

## 2021-02-19 PROCEDURE — 83021 HEMOGLOBIN CHROMOTOGRAPHY: CPT

## 2021-02-19 RX ORDER — ONDANSETRON 2 MG/ML
8 INJECTION INTRAMUSCULAR; INTRAVENOUS AS NEEDED
Status: CANCELLED | OUTPATIENT
Start: 2021-02-26

## 2021-02-19 RX ORDER — HYDROCORTISONE SODIUM SUCCINATE 100 MG/2ML
100 INJECTION, POWDER, FOR SOLUTION INTRAMUSCULAR; INTRAVENOUS AS NEEDED
Status: CANCELLED | OUTPATIENT
Start: 2021-02-26

## 2021-02-19 RX ORDER — DIPHENHYDRAMINE HYDROCHLORIDE 50 MG/ML
25 INJECTION, SOLUTION INTRAMUSCULAR; INTRAVENOUS AS NEEDED
Status: CANCELLED
Start: 2021-02-26

## 2021-02-19 RX ORDER — SODIUM CHLORIDE 0.9 % (FLUSH) 0.9 %
10 SYRINGE (ML) INJECTION AS NEEDED
Status: CANCELLED | OUTPATIENT
Start: 2021-02-26

## 2021-02-19 RX ORDER — HEPARIN 100 UNIT/ML
300-500 SYRINGE INTRAVENOUS AS NEEDED
Status: CANCELLED
Start: 2021-02-26

## 2021-02-19 RX ORDER — EPINEPHRINE 1 MG/ML
0.3 INJECTION, SOLUTION, CONCENTRATE INTRAVENOUS AS NEEDED
Status: CANCELLED | OUTPATIENT
Start: 2021-02-26

## 2021-02-19 RX ORDER — SODIUM CHLORIDE 9 MG/ML
25 INJECTION, SOLUTION INTRAVENOUS CONTINUOUS
Status: DISCONTINUED | OUTPATIENT
Start: 2021-02-19 | End: 2021-02-20 | Stop reason: HOSPADM

## 2021-02-19 RX ORDER — DIPHENHYDRAMINE HYDROCHLORIDE 50 MG/ML
50 INJECTION, SOLUTION INTRAMUSCULAR; INTRAVENOUS AS NEEDED
Status: CANCELLED
Start: 2021-02-26

## 2021-02-19 RX ORDER — SODIUM CHLORIDE 9 MG/ML
10 INJECTION INTRAMUSCULAR; INTRAVENOUS; SUBCUTANEOUS AS NEEDED
Status: CANCELLED | OUTPATIENT
Start: 2021-02-26

## 2021-02-19 RX ORDER — SODIUM CHLORIDE 0.9 % (FLUSH) 0.9 %
10 SYRINGE (ML) INJECTION AS NEEDED
Status: DISCONTINUED | OUTPATIENT
Start: 2021-02-19 | End: 2021-02-20 | Stop reason: HOSPADM

## 2021-02-19 RX ORDER — SODIUM CHLORIDE 9 MG/ML
25 INJECTION, SOLUTION INTRAVENOUS CONTINUOUS
Status: CANCELLED | OUTPATIENT
Start: 2021-02-26

## 2021-02-19 RX ORDER — ALBUTEROL SULFATE 0.83 MG/ML
2.5 SOLUTION RESPIRATORY (INHALATION) AS NEEDED
Status: CANCELLED
Start: 2021-02-26

## 2021-02-19 RX ORDER — ACETAMINOPHEN 325 MG/1
650 TABLET ORAL AS NEEDED
Status: CANCELLED
Start: 2021-02-26

## 2021-02-19 RX ADMIN — Medication 10 ML: at 15:05

## 2021-02-19 RX ADMIN — Medication 10 ML: at 13:50

## 2021-02-19 RX ADMIN — FERRIC CARBOXYMALTOSE INJECTION 750 MG: 50 INJECTION, SOLUTION INTRAVENOUS at 14:05

## 2021-02-19 RX ADMIN — SODIUM CHLORIDE 25 ML/HR: 0.9 INJECTION, SOLUTION INTRAVENOUS at 14:05

## 2021-02-19 NOTE — PROGRESS NOTES
[facility] Progress Note    Date: 2021    Name: Ketan Plascencia    MRN: [id number]         : 1975    Injectafer Infusion 1 of 2    Ms. Hopper to [facility], at 1335. Pt was assessed and education was provided. Ms. Katy Saleem vitals were reviewed and patient was observed for 5 minutes prior to treatment. Visit Vitals  /73   Pulse 66   Temp 97.1 °F (36.2 °C)   Resp 16   SpO2 100%   Breastfeeding No         22 g PIV placed in RAC x one attempt by Mony Lombardo. Blood drawn by CLINT Bernabe for Celiac Panel and Hemoglobin Fractionation . PIV flushed easily and had brisk blood return. Injectafer 750 mg in 250 ml of NS was infused over 20 minutes per order. VS stable and pt denied complaints of itching, lip/tongue/facial swelling, SOB, CP or other complaints. Ms. Tino Martinez tolerated the infusion, and had no complaints. VS remained stable. Patient observed for 30 minutes after infusion completed. PIV flushed with NS 10 ml and removed catheter intact. No bleeding or hematoma noted at site. Gauze and coban applied. Reviewed discharge instructions with patient, including expected side effects (abdominal cramping, nausea, changes in color of urine or feces) and signs of allergic reaction requiring medical attention (itching/hives/rashes, SOB, chest pain, lip/tongue/facial swelling). Patient given printed copy to take home. Patient verbalized understanding of discharge instructions. Patient armband removed and shredded. Ms. Tino Martinez was discharged from [place] [address] in stable condition at 1510. Her next appointment will be 2021 at 1400.     Sherine Farris RN  2021  3:05 PM

## 2021-02-22 LAB
DEPRECATED HGB OTHER BLD-IMP: 0 %
HGB A MFR BLD: 98.6 % (ref 96.4–98.8)
HGB A2 MFR BLD COLUMN CHROM: 1.4 % (ref 1.8–3.2)
HGB C MFR BLD: 0 %
HGB F MFR BLD: 0 % (ref 0–2)
HGB FRACT BLD-IMP: ABNORMAL
HGB S BLD QL SOLY: NEGATIVE
HGB S MFR BLD: 0 %

## 2021-02-23 LAB
ENDOMYSIUM IGA SER QL: NEGATIVE
GLIADIN PEPTIDE IGA SER-ACNC: 9 UNITS (ref 0–19)
GLIADIN PEPTIDE IGG SER-ACNC: 3 UNITS (ref 0–19)
IGA SERPL-MCNC: 454 MG/DL (ref 87–352)
TTG IGA SER-ACNC: <2 U/ML (ref 0–3)
TTG IGG SER-ACNC: 6 U/ML (ref 0–5)

## 2021-02-26 ENCOUNTER — HOSPITAL ENCOUNTER (OUTPATIENT)
Dept: INFUSION THERAPY | Age: 46
Discharge: HOME | End: 2021-02-26
Payer: MEDICAID

## 2021-02-26 ENCOUNTER — TELEPHONE (OUTPATIENT)
Age: 46
End: 2021-02-26

## 2021-02-26 VITALS
DIASTOLIC BLOOD PRESSURE: 73 MMHG | TEMPERATURE: 97.1 F | RESPIRATION RATE: 16 BRPM | HEART RATE: 61 BPM | OXYGEN SATURATION: 100 % | SYSTOLIC BLOOD PRESSURE: 112 MMHG

## 2021-02-26 DIAGNOSIS — D50.9 IRON DEFICIENCY ANEMIA, UNSPECIFIED IRON DEFICIENCY ANEMIA TYPE: Primary | ICD-10-CM

## 2021-02-26 DIAGNOSIS — D50.9 IRON DEFICIENCY ANEMIA, UNSPECIFIED IRON DEFICIENCY ANEMIA TYPE: ICD-10-CM

## 2021-02-26 PROCEDURE — 96365 THER/PROPH/DIAG IV INF INIT: CPT

## 2021-02-26 PROCEDURE — 74011250636 HC RX REV CODE- 250/636: Performed by: INTERNAL MEDICINE

## 2021-02-26 RX ORDER — ONDANSETRON 2 MG/ML
8 INJECTION INTRAMUSCULAR; INTRAVENOUS AS NEEDED
Status: CANCELLED | OUTPATIENT
Start: 2021-02-26

## 2021-02-26 RX ORDER — DIPHENHYDRAMINE HYDROCHLORIDE 50 MG/ML
25 INJECTION, SOLUTION INTRAMUSCULAR; INTRAVENOUS AS NEEDED
Status: CANCELLED
Start: 2021-02-26

## 2021-02-26 RX ORDER — DIPHENHYDRAMINE HYDROCHLORIDE 50 MG/ML
50 INJECTION, SOLUTION INTRAMUSCULAR; INTRAVENOUS AS NEEDED
Status: CANCELLED
Start: 2021-02-26

## 2021-02-26 RX ORDER — SODIUM CHLORIDE 9 MG/ML
25 INJECTION, SOLUTION INTRAVENOUS CONTINUOUS
Status: CANCELLED | OUTPATIENT
Start: 2021-02-26

## 2021-02-26 RX ORDER — EPINEPHRINE 1 MG/ML
0.3 INJECTION, SOLUTION, CONCENTRATE INTRAVENOUS AS NEEDED
Status: CANCELLED | OUTPATIENT
Start: 2021-02-26

## 2021-02-26 RX ORDER — SODIUM CHLORIDE 9 MG/ML
10 INJECTION INTRAMUSCULAR; INTRAVENOUS; SUBCUTANEOUS AS NEEDED
Status: CANCELLED | OUTPATIENT
Start: 2021-02-26

## 2021-02-26 RX ORDER — SODIUM CHLORIDE 0.9 % (FLUSH) 0.9 %
10 SYRINGE (ML) INJECTION AS NEEDED
Status: DISCONTINUED | OUTPATIENT
Start: 2021-02-26 | End: 2021-02-27 | Stop reason: HOSPADM

## 2021-02-26 RX ORDER — HEPARIN 100 UNIT/ML
300-500 SYRINGE INTRAVENOUS AS NEEDED
Status: CANCELLED
Start: 2021-02-26

## 2021-02-26 RX ORDER — ACETAMINOPHEN 325 MG/1
650 TABLET ORAL AS NEEDED
Status: CANCELLED
Start: 2021-02-26

## 2021-02-26 RX ORDER — HYDROCORTISONE SODIUM SUCCINATE 100 MG/2ML
100 INJECTION, POWDER, FOR SOLUTION INTRAMUSCULAR; INTRAVENOUS AS NEEDED
Status: CANCELLED | OUTPATIENT
Start: 2021-02-26

## 2021-02-26 RX ORDER — ALBUTEROL SULFATE 0.83 MG/ML
2.5 SOLUTION RESPIRATORY (INHALATION) AS NEEDED
Status: CANCELLED
Start: 2021-02-26

## 2021-02-26 RX ORDER — SODIUM CHLORIDE 9 MG/ML
25 INJECTION, SOLUTION INTRAVENOUS CONTINUOUS
Status: DISCONTINUED | OUTPATIENT
Start: 2021-02-26 | End: 2021-02-27 | Stop reason: HOSPADM

## 2021-02-26 RX ORDER — SODIUM CHLORIDE 0.9 % (FLUSH) 0.9 %
10 SYRINGE (ML) INJECTION AS NEEDED
Status: CANCELLED | OUTPATIENT
Start: 2021-02-26

## 2021-02-26 RX ADMIN — FERRIC CARBOXYMALTOSE INJECTION 750 MG: 50 INJECTION, SOLUTION INTRAVENOUS at 14:10

## 2021-02-26 RX ADMIN — Medication 10 ML: at 14:30

## 2021-02-26 RX ADMIN — SODIUM CHLORIDE 25 ML/HR: 0.9 INJECTION, SOLUTION INTRAVENOUS at 14:10

## 2021-02-26 NOTE — TELEPHONE ENCOUNTER
Patient would like to speak with someone concerning a phone call she received from GI, she wants to know why they called for an appointment

## 2021-02-26 NOTE — TELEPHONE ENCOUNTER
4:00pm-Spoke with patient. No further questions at this time. Provided Pt with [employer] phone number in the event she doesn't receive a call from  their scheduling.

## 2021-02-26 NOTE — PROGRESS NOTES
[facility] Progress Note    Date: 2021    Name: Jennetta Soulier    MRN: [id number]         : 1975    Injectafer Infusion 2 of 2    Ms. Hopper to [facility], ambulatory, at 1355. Pt was assessed and education was provided. Patient stated that she has so much more energy now and feels great. Ms. Consuelo Ramirez vitals were reviewed and patient was observed for 5 minutes prior to treatment. Visit Vitals  /80 (BP 1 Location: Right arm, BP Patient Position: Sitting)   Pulse 66   Temp 97.7 °F (36.5 °C)   Resp 16   SpO2 98%   Breastfeeding No         22 g PIV placed in LAC x one attempt. PIV flushed easily and had brisk blood return. Injectafer 750 mg/250 ml of NS was infused over 20 minutes per order . Ms. Hopper tolerated the infusion, and had no complaints. VS remained stable. PIV flushed with NS 10 ml and removed catheter intact. No bleeding or hematoma noted at site. Gauze and coban applied. Patient armband removed and shredded. Ms. Carina Piedra was discharged from [place] [address] in stable condition at 1440. She is to follow up with PCP as needed.     Fannie Fajardo RN  2021  2:46 PM

## 2021-03-03 ENCOUNTER — VIRTUAL VISIT (OUTPATIENT)
Age: 46
End: 2021-03-03
Payer: MEDICAID

## 2021-03-03 DIAGNOSIS — D50.9 IRON DEFICIENCY ANEMIA, UNSPECIFIED IRON DEFICIENCY ANEMIA TYPE: Primary | ICD-10-CM

## 2021-03-03 DIAGNOSIS — N92.4 EXCESSIVE BLEEDING IN PREMENOPAUSAL PERIOD: ICD-10-CM

## 2021-03-03 PROCEDURE — 99443 PR PHYS/QHP TELEPHONE EVALUATION 21-30 MIN: CPT | Performed by: INTERNAL MEDICINE

## 2021-03-03 NOTE — PROGRESS NOTES
Teresa Dumont is a 39 y.o. female who was seen by synchronous (real-time) audio- technology on 3/3/2021. Assessment & Plan:   Diagnoses and all orders for this visit:    1. Iron deficiency anemia, unspecified iron deficiency anemia type    2. Excessive bleeding in premenopausal period      The complexity of medical decision making for this visit is moderate       1. Iron deficiency anemia, unspecified iron deficiency anemia type  I had a long discussion with patient regarding her iron deficiency anemia. Labs on 21 showed BUN 15, creatinine 0.80. Ferritin 3, transferrin saturation 3%. Vitamin B12 270. WBC 9.8, H&H 7.1/26.8, platelet 669. Hemoglobin electrophoresis normal.  To see transglutaminase is elevated as well as IgA. Patient has severe iron deficiency anemia which is likely multifactorial from heavy menses but also from malabsorption due to celiac disease. *She is status post IV iron Injectafer completed on 2021. *Recheck labs in 3 months    2. Excessive bleeding in premenopausal period: Follow-up with OB/GYN  3. Thrombocytosis: This is likely reactive from iron deficiency anemia. I would expect thrombocytosis to resolve soon now that iron stores have been replenished. 4. Celiac disease: Told patient to stay away from gluten-containing food. RTC 3 months with repeat lab    I spent at least 25 minutes on this visit with this established patient. 712  Subjective:   Teresa Dumont is a 37 y.o. 1975 female and presents with Anemia (Iron deficiency anemia)  Patient with uterine fibroids status post myomectomy in ,  x2, HTN, who I was asked to see in consultation at the request of Dr. Hart for evaluation for anemia. I found that she had iron deficiency anemia and she status post 2 units of packed red blood cells due to hemoglobin of 6.3, was scheduled to see OB/GYN due to menometrorrhagia as well as to receive IV iron infusion.   However patient did not have IV iron and did not also see OB/GYN due to insurance issues she said. She now has insurance again she stated.     On review of systems today she reports fatigue. Denies any fevers, chills, shortness of breath, nausea vomiting abdominal pain. Has heavy menses but much better. No focal neurologic deficit. Mood is stable. ECOG performance status 0. Independent with ADLs and IADLs. Prior to Admission medications    Medication Sig Start Date End Date Taking? Authorizing Provider   lisinopril-hydroCHLOROthiazide Paige Rhein, ZESTORETIC) 10-12.5 mg per tablet  3/28/19   Provider, Historical     Patient Active Problem List   Diagnosis Code    Anemia D64.9    Iron deficiency anemia D50.9    Excessive bleeding in premenopausal period N92.4     Patient Active Problem List    Diagnosis Date Noted    Iron deficiency anemia 2019    Excessive bleeding in premenopausal period 2019    Anemia 2019     Current Outpatient Medications   Medication Sig Dispense Refill    lisinopril-hydroCHLOROthiazide (PRINZIDE, ZESTORETIC) 10-12.5 mg per tablet   4     No Known Allergies  Past Medical History:   Diagnosis Date    Anemia     History of uterine fibroid     Hypertension      Past Surgical History:   Procedure Laterality Date    HX  SECTION      HX  SECTION      HX MYOMECTOMY      HX TUBAL LIGATION       Family History   Problem Relation Age of Onset    Heart Disease Mother     Hypertension Mother     Heart Disease Father     Hypertension Father      Social History     Tobacco Use    Smoking status: Never Smoker    Smokeless tobacco: Never Used   Substance Use Topics    Alcohol use: Not Currently       ROS: as per HPI    Objective:   No flowsheet data found. General: alert, cooperative, no distress                                 Additional exam findings: We discussed the expected course, resolution and complications of the diagnosis(es) in detail.   Medication risks, benefits, costs, interactions, and alternatives were discussed as indicated. I advised her to contact the office if her condition worsens, changes or fails to improve as anticipated. She expressed understanding with the diagnosis(es) and plan. Zenaida Lopes, was evaluated through a synchronous (real-time) audio- encounter. The patient (or guardian if applicable) is aware that this is a billable service. Verbal consent to proceed has been obtained within the past 12 months. The visit was conducted pursuant to the emergency declaration under the [address] [place] [address], [address] [place] [address] authority and the [employer] and [employer] General Act. Patient identification was verified, and a caregiver was present when appropriate. The patient was located in a state where the provider was credentialed to provide care.     Hector Hubbard MD

## 2021-05-05 ENCOUNTER — HOSPITAL ENCOUNTER (OUTPATIENT)
Dept: INFUSION THERAPY | Age: 46
Discharge: HOME | End: 2021-05-05
Payer: MEDICAID

## 2021-05-05 DIAGNOSIS — D50.9 IRON DEFICIENCY ANEMIA, UNSPECIFIED IRON DEFICIENCY ANEMIA TYPE: ICD-10-CM

## 2021-05-05 LAB
ALBUMIN SERPL-MCNC: 3.5 G/DL (ref 3.4–5)
ALBUMIN/GLOB SERPL: 0.6 {RATIO} (ref 0.8–1.7)
ALP SERPL-CCNC: 87 U/L (ref 45–117)
ALT SERPL-CCNC: 17 U/L (ref 13–56)
ANION GAP SERPL CALC-SCNC: 5 MMOL/L (ref 3–18)
AST SERPL-CCNC: 14 U/L (ref 10–38)
BASO+EOS+MONOS # BLD AUTO: 0.5 K/UL (ref 0–2.3)
BASO+EOS+MONOS NFR BLD AUTO: 7 % (ref 0.1–17)
BILIRUB SERPL-MCNC: 0.2 MG/DL (ref 0.2–1)
BUN SERPL-MCNC: 14 MG/DL (ref 7–18)
BUN/CREAT SERPL: 19 (ref 12–20)
CALCIUM SERPL-MCNC: 9 MG/DL (ref 8.5–10.1)
CHLORIDE SERPL-SCNC: 103 MMOL/L (ref 100–111)
CO2 SERPL-SCNC: 27 MMOL/L (ref 21–32)
CREAT SERPL-MCNC: 0.73 MG/DL (ref 0.6–1.3)
DIFFERENTIAL METHOD BLD: ABNORMAL
ERYTHROCYTE [DISTWIDTH] IN BLOOD BY AUTOMATED COUNT: 20.2 % (ref 11.5–14.5)
FERRITIN SERPL-MCNC: 40 NG/ML (ref 8–388)
FOLATE SERPL-MCNC: 9 NG/ML (ref 3.1–17.5)
GLOBULIN SER CALC-MCNC: 5.6 G/DL (ref 2–4)
GLUCOSE SERPL-MCNC: 59 MG/DL (ref 74–99)
HCT VFR BLD AUTO: 38.5 % (ref 36–48)
HGB BLD-MCNC: 11.9 G/DL (ref 12–16)
IRON SATN MFR SERPL: 11 % (ref 20–50)
IRON SERPL-MCNC: 33 UG/DL (ref 50–175)
LYMPHOCYTES # BLD: 2.4 K/UL (ref 1.1–5.9)
LYMPHOCYTES NFR BLD: 38 % (ref 14–44)
MCH RBC QN AUTO: 25.4 PG (ref 25–35)
MCHC RBC AUTO-ENTMCNC: 30.9 G/DL (ref 31–37)
MCV RBC AUTO: 82.3 FL (ref 78–102)
NEUTS SEG # BLD: 3.5 K/UL (ref 1.8–9.5)
NEUTS SEG NFR BLD: 55 % (ref 40–70)
PLATELET # BLD AUTO: 230 K/UL (ref 140–440)
POTASSIUM SERPL-SCNC: 3.4 MMOL/L (ref 3.5–5.5)
PROT SERPL-MCNC: 9.1 G/DL (ref 6.4–8.2)
RBC # BLD AUTO: 4.68 M/UL (ref 4.1–5.1)
SODIUM SERPL-SCNC: 135 MMOL/L (ref 136–145)
TIBC SERPL-MCNC: 303 UG/DL (ref 250–450)
TSH SERPL DL<=0.05 MIU/L-ACNC: 1.22 UIU/ML (ref 0.36–3.74)
VIT B12 SERPL-MCNC: 219 PG/ML (ref 211–911)
WBC # BLD AUTO: 6.4 K/UL (ref 4.5–13)

## 2021-05-05 PROCEDURE — 82728 ASSAY OF FERRITIN: CPT

## 2021-05-05 PROCEDURE — 85025 COMPLETE CBC W/AUTO DIFF WBC: CPT

## 2021-05-05 PROCEDURE — 80053 COMPREHEN METABOLIC PANEL: CPT

## 2021-05-05 PROCEDURE — 36415 COLL VENOUS BLD VENIPUNCTURE: CPT

## 2021-05-05 PROCEDURE — 84443 ASSAY THYROID STIM HORMONE: CPT

## 2021-05-05 PROCEDURE — 82607 VITAMIN B-12: CPT

## 2021-05-05 PROCEDURE — 83540 ASSAY OF IRON: CPT

## 2021-05-07 ENCOUNTER — DOCUMENTATION ONLY (OUTPATIENT)
Age: 46
End: 2021-05-07

## 2021-05-07 DIAGNOSIS — D50.9 IRON DEFICIENCY ANEMIA, UNSPECIFIED IRON DEFICIENCY ANEMIA TYPE: Primary | ICD-10-CM

## 2021-05-07 DIAGNOSIS — D50.9 IRON DEFICIENCY ANEMIA, UNSPECIFIED IRON DEFICIENCY ANEMIA TYPE: ICD-10-CM

## 2021-05-07 NOTE — PROGRESS NOTES
I attempted to call the patient today at 1 re: labs and thiamine. Patient did not answer, LVM to call back our office at [phone].

## 2021-05-12 RX ORDER — DIPHENHYDRAMINE HYDROCHLORIDE 50 MG/ML
50 INJECTION, SOLUTION INTRAMUSCULAR; INTRAVENOUS AS NEEDED
Status: CANCELLED
Start: 2021-05-19

## 2021-05-12 RX ORDER — HEPARIN 100 UNIT/ML
300-500 SYRINGE INTRAVENOUS AS NEEDED
Status: CANCELLED
Start: 2021-05-19

## 2021-05-12 RX ORDER — ONDANSETRON 2 MG/ML
8 INJECTION INTRAMUSCULAR; INTRAVENOUS AS NEEDED
Status: CANCELLED | OUTPATIENT
Start: 2021-05-19

## 2021-05-12 RX ORDER — DIPHENHYDRAMINE HYDROCHLORIDE 50 MG/ML
25 INJECTION, SOLUTION INTRAMUSCULAR; INTRAVENOUS AS NEEDED
Status: CANCELLED
Start: 2021-05-19

## 2021-05-12 RX ORDER — ACETAMINOPHEN 325 MG/1
650 TABLET ORAL AS NEEDED
Status: CANCELLED
Start: 2021-05-19

## 2021-05-12 RX ORDER — SODIUM CHLORIDE 9 MG/ML
10 INJECTION INTRAMUSCULAR; INTRAVENOUS; SUBCUTANEOUS AS NEEDED
Status: CANCELLED | OUTPATIENT
Start: 2021-05-19

## 2021-05-12 RX ORDER — ALBUTEROL SULFATE 0.83 MG/ML
2.5 SOLUTION RESPIRATORY (INHALATION) AS NEEDED
Status: CANCELLED
Start: 2021-05-19

## 2021-05-12 RX ORDER — HYDROCORTISONE SODIUM SUCCINATE 100 MG/2ML
100 INJECTION, POWDER, FOR SOLUTION INTRAMUSCULAR; INTRAVENOUS AS NEEDED
Status: CANCELLED | OUTPATIENT
Start: 2021-05-19

## 2021-05-12 RX ORDER — EPINEPHRINE 1 MG/ML
0.3 INJECTION, SOLUTION, CONCENTRATE INTRAVENOUS AS NEEDED
Status: CANCELLED | OUTPATIENT
Start: 2021-05-19

## 2021-05-13 ENCOUNTER — TELEPHONE (OUTPATIENT)
Age: 46
End: 2021-05-13

## 2021-05-13 DIAGNOSIS — D50.9 IRON DEFICIENCY ANEMIA, UNSPECIFIED IRON DEFICIENCY ANEMIA TYPE: Primary | ICD-10-CM

## 2021-05-19 ENCOUNTER — HOSPITAL ENCOUNTER (OUTPATIENT)
Dept: INFUSION THERAPY | Age: 46
Discharge: HOME | End: 2021-05-19
Payer: MEDICAID

## 2021-05-19 VITALS
HEART RATE: 59 BPM | OXYGEN SATURATION: 100 % | DIASTOLIC BLOOD PRESSURE: 77 MMHG | TEMPERATURE: 98.6 F | SYSTOLIC BLOOD PRESSURE: 117 MMHG | RESPIRATION RATE: 16 BRPM

## 2021-05-19 DIAGNOSIS — D50.9 IRON DEFICIENCY ANEMIA, UNSPECIFIED IRON DEFICIENCY ANEMIA TYPE: Primary | ICD-10-CM

## 2021-05-19 PROCEDURE — 74011250636 HC RX REV CODE- 250/636: Performed by: NURSE PRACTITIONER

## 2021-05-19 PROCEDURE — 96365 THER/PROPH/DIAG IV INF INIT: CPT

## 2021-05-19 RX ORDER — SODIUM CHLORIDE 0.9 % (FLUSH) 0.9 %
10 SYRINGE (ML) INJECTION AS NEEDED
Status: DISPENSED | OUTPATIENT
Start: 2021-05-19 | End: 2021-05-19

## 2021-05-19 RX ORDER — ACETAMINOPHEN 325 MG/1
650 TABLET ORAL AS NEEDED
Status: CANCELLED
Start: 2021-05-26

## 2021-05-19 RX ORDER — DIPHENHYDRAMINE HYDROCHLORIDE 50 MG/ML
50 INJECTION, SOLUTION INTRAMUSCULAR; INTRAVENOUS AS NEEDED
Status: CANCELLED
Start: 2021-05-26

## 2021-05-19 RX ORDER — ALBUTEROL SULFATE 0.83 MG/ML
2.5 SOLUTION RESPIRATORY (INHALATION) AS NEEDED
Status: CANCELLED
Start: 2021-05-26

## 2021-05-19 RX ORDER — SODIUM CHLORIDE 9 MG/ML
10 INJECTION INTRAMUSCULAR; INTRAVENOUS; SUBCUTANEOUS AS NEEDED
Status: CANCELLED | OUTPATIENT
Start: 2021-05-26

## 2021-05-19 RX ORDER — HYDROCORTISONE SODIUM SUCCINATE 100 MG/2ML
100 INJECTION, POWDER, FOR SOLUTION INTRAMUSCULAR; INTRAVENOUS AS NEEDED
Status: CANCELLED | OUTPATIENT
Start: 2021-05-26

## 2021-05-19 RX ORDER — SODIUM CHLORIDE 0.9 % (FLUSH) 0.9 %
10 SYRINGE (ML) INJECTION AS NEEDED
Status: CANCELLED | OUTPATIENT
Start: 2021-05-26

## 2021-05-19 RX ORDER — DIPHENHYDRAMINE HYDROCHLORIDE 50 MG/ML
25 INJECTION, SOLUTION INTRAMUSCULAR; INTRAVENOUS AS NEEDED
Status: CANCELLED
Start: 2021-05-26

## 2021-05-19 RX ORDER — SODIUM CHLORIDE 9 MG/ML
25 INJECTION, SOLUTION INTRAVENOUS CONTINUOUS
Status: CANCELLED | OUTPATIENT
Start: 2021-05-26

## 2021-05-19 RX ORDER — HEPARIN 100 UNIT/ML
300-500 SYRINGE INTRAVENOUS AS NEEDED
Status: CANCELLED
Start: 2021-05-26

## 2021-05-19 RX ORDER — EPINEPHRINE 1 MG/ML
0.3 INJECTION, SOLUTION, CONCENTRATE INTRAVENOUS AS NEEDED
Status: CANCELLED | OUTPATIENT
Start: 2021-05-26

## 2021-05-19 RX ORDER — ONDANSETRON 2 MG/ML
8 INJECTION INTRAMUSCULAR; INTRAVENOUS AS NEEDED
Status: CANCELLED | OUTPATIENT
Start: 2021-05-26

## 2021-05-19 RX ORDER — SODIUM CHLORIDE 9 MG/ML
25 INJECTION, SOLUTION INTRAVENOUS CONTINUOUS
Status: DISPENSED | OUTPATIENT
Start: 2021-05-19 | End: 2021-05-19

## 2021-05-19 RX ADMIN — Medication 10 ML: at 08:33

## 2021-05-19 RX ADMIN — SODIUM CHLORIDE 25 ML/HR: 0.9 INJECTION, SOLUTION INTRAVENOUS at 08:40

## 2021-05-19 RX ADMIN — FERRIC CARBOXYMALTOSE INJECTION 750 MG: 50 INJECTION, SOLUTION INTRAVENOUS at 08:40

## 2021-05-19 RX ADMIN — Medication 10 ML: at 09:05

## 2021-05-19 NOTE — PROGRESS NOTES
[facility] OPI Progress Note    Date: May 19, 2021    Name: Jostin Neely    MRN: [id number]         : 1975    Injectafer Infusion (1 of 2)    Ms. Hopper arrived to [facility], ambulatory, and in no acute distress at 0815. Pt presents today for first of two Injectafer infusions, as ordered. Patient has received Injectafer IV in the past without incident. Patient was assessed and education provided. Patient denies any complaints of pain/discomfort, recent changes to her health condition or medication regimen. Ms. Gigi Kramer vitals were reviewed and patient was observed for 5 minutes prior to treatment. Visit Vitals  /77 (BP 1 Location: Left lower arm, BP Patient Position: At rest;Sitting)   Pulse (!) 59   Temp 98.6 °F (37 °C)   Resp 16   SpO2 100%   Breastfeeding No     IV site established on first attempt with 24 g PIV catheter inserted in median vein to right forearm. Brisk blood return was noted and catheter flushed easily with 10 mL NS. Injectafer 750 mg/250 mL of NS was infused over approximately 20 minutes without incident. Ms. Mackenzie Smith tolerated the infusion, and had no complaints. VS remained stable. Patient Vitals for the past 12 hrs:   Temp Pulse Resp BP SpO2   21 0905 98.6 °F (37 °C)  16 117/77 100 %   21 0819 98.9 °F (37.2 °C) (!) 59 18 115/75 100 %       PIV flushed with 10 mL NS and catheter removed intact. No bleeding or hematoma noted at site. Gauze dressing applied to site and coban applied. Patient armband removed and shredded. Ms. Mackenzie Smith was discharged from [place] [address] in stable condition at 0910. She is to return to [facility] on 2021 at 1400 for her next appointment.      Queen Loretta RN  May 19, 2021  2:46 PM

## 2021-05-26 ENCOUNTER — HOSPITAL ENCOUNTER (OUTPATIENT)
Dept: INFUSION THERAPY | Age: 46
Discharge: HOME | End: 2021-05-26
Payer: MEDICAID

## 2021-05-26 VITALS
TEMPERATURE: 97.6 F | HEART RATE: 68 BPM | DIASTOLIC BLOOD PRESSURE: 72 MMHG | OXYGEN SATURATION: 100 % | SYSTOLIC BLOOD PRESSURE: 124 MMHG | RESPIRATION RATE: 18 BRPM

## 2021-05-26 DIAGNOSIS — D50.9 IRON DEFICIENCY ANEMIA, UNSPECIFIED IRON DEFICIENCY ANEMIA TYPE: Primary | ICD-10-CM

## 2021-05-26 PROCEDURE — 96365 THER/PROPH/DIAG IV INF INIT: CPT

## 2021-05-26 PROCEDURE — 74011250636 HC RX REV CODE- 250/636: Performed by: NURSE PRACTITIONER

## 2021-05-26 RX ORDER — SODIUM CHLORIDE 9 MG/ML
10 INJECTION INTRAMUSCULAR; INTRAVENOUS; SUBCUTANEOUS AS NEEDED
Status: CANCELLED | OUTPATIENT
Start: 2021-05-26

## 2021-05-26 RX ORDER — SODIUM CHLORIDE 9 MG/ML
25 INJECTION, SOLUTION INTRAVENOUS CONTINUOUS
Status: CANCELLED | OUTPATIENT
Start: 2021-05-26

## 2021-05-26 RX ORDER — ONDANSETRON 2 MG/ML
8 INJECTION INTRAMUSCULAR; INTRAVENOUS AS NEEDED
Status: CANCELLED | OUTPATIENT
Start: 2021-05-26

## 2021-05-26 RX ORDER — HEPARIN 100 UNIT/ML
300-500 SYRINGE INTRAVENOUS AS NEEDED
Status: CANCELLED
Start: 2021-05-26

## 2021-05-26 RX ORDER — ACETAMINOPHEN 325 MG/1
650 TABLET ORAL AS NEEDED
Status: CANCELLED
Start: 2021-05-26

## 2021-05-26 RX ORDER — SODIUM CHLORIDE 0.9 % (FLUSH) 0.9 %
10 SYRINGE (ML) INJECTION AS NEEDED
Status: DISCONTINUED | OUTPATIENT
Start: 2021-05-26 | End: 2021-05-27 | Stop reason: HOSPADM

## 2021-05-26 RX ORDER — SODIUM CHLORIDE 0.9 % (FLUSH) 0.9 %
10 SYRINGE (ML) INJECTION AS NEEDED
Status: CANCELLED | OUTPATIENT
Start: 2021-05-26

## 2021-05-26 RX ORDER — DIPHENHYDRAMINE HYDROCHLORIDE 50 MG/ML
50 INJECTION, SOLUTION INTRAMUSCULAR; INTRAVENOUS AS NEEDED
Status: CANCELLED
Start: 2021-05-26

## 2021-05-26 RX ORDER — SODIUM CHLORIDE 9 MG/ML
25 INJECTION, SOLUTION INTRAVENOUS CONTINUOUS
Status: DISCONTINUED | OUTPATIENT
Start: 2021-05-26 | End: 2021-05-27 | Stop reason: HOSPADM

## 2021-05-26 RX ORDER — ALBUTEROL SULFATE 0.83 MG/ML
2.5 SOLUTION RESPIRATORY (INHALATION) AS NEEDED
Status: CANCELLED
Start: 2021-05-26

## 2021-05-26 RX ORDER — HYDROCORTISONE SODIUM SUCCINATE 100 MG/2ML
100 INJECTION, POWDER, FOR SOLUTION INTRAMUSCULAR; INTRAVENOUS AS NEEDED
Status: CANCELLED | OUTPATIENT
Start: 2021-05-26

## 2021-05-26 RX ORDER — DIPHENHYDRAMINE HYDROCHLORIDE 50 MG/ML
25 INJECTION, SOLUTION INTRAMUSCULAR; INTRAVENOUS AS NEEDED
Status: CANCELLED
Start: 2021-05-26

## 2021-05-26 RX ORDER — EPINEPHRINE 1 MG/ML
0.3 INJECTION, SOLUTION, CONCENTRATE INTRAVENOUS AS NEEDED
Status: CANCELLED | OUTPATIENT
Start: 2021-05-26

## 2021-05-26 RX ADMIN — Medication 10 ML: at 14:36

## 2021-05-26 RX ADMIN — Medication 10 ML: at 14:12

## 2021-05-26 RX ADMIN — FERRIC CARBOXYMALTOSE INJECTION 750 MG: 50 INJECTION, SOLUTION INTRAVENOUS at 14:13

## 2021-05-26 RX ADMIN — SODIUM CHLORIDE 25 ML/HR: 0.9 INJECTION, SOLUTION INTRAVENOUS at 14:12

## 2021-05-26 NOTE — PROGRESS NOTES
[facility] OPIC Progress Note    Date: May 26, 2021    Name: Pinky Tong    MRN: [id number]         : 1975    Injectafer Infusion 2 of 2    Ms. Hopper to [facility], ambulatory, at 1400. Pt was assessed and education was provided. Ms. Sis Velázquez vitals were reviewed and patient was observed for 5 minutes prior to treatment. Visit Vitals  /75 (BP 1 Location: Left lower arm, BP Patient Position: Sitting)   Pulse 64   Temp 99.1 °F (37.3 °C)   Resp 18   SpO2 100%         24 g PIV placed in LAC x one attempt. PIV flushed easily and had brisk blood return. Injectafer 750 mg/250 ml NS was initiated @ 750 ml/hr over 20 minutes as ordered. VS stable and pt denied complaints of itching, lip/tongue/facial swelling, SOB, CP or other complaints. Ms. Nando Sánchez tolerated the infusion, and had no complaints. VS remained stable. PIV flushed with NS 10 ml and removed catheter intact. No bleeding or hematoma noted at site. Gauze and coban applied. Patient armband removed and shredded. Ms. Nando Sánchez was discharged from [place] [address] in stable condition at 1440. She is to follow up with referring provider/physician as needed.     Juany Lawler RN  May 26, 2021  2:46 PM

## 2021-06-01 DIAGNOSIS — N92.4 EXCESSIVE BLEEDING IN PREMENOPAUSAL PERIOD: ICD-10-CM

## 2021-06-01 DIAGNOSIS — D50.9 IRON DEFICIENCY ANEMIA, UNSPECIFIED IRON DEFICIENCY ANEMIA TYPE: Primary | ICD-10-CM

## 2021-06-01 DIAGNOSIS — D50.9 IRON DEFICIENCY ANEMIA, UNSPECIFIED IRON DEFICIENCY ANEMIA TYPE: ICD-10-CM

## 2021-06-03 DIAGNOSIS — D50.9 IRON DEFICIENCY ANEMIA, UNSPECIFIED IRON DEFICIENCY ANEMIA TYPE: ICD-10-CM
